# Patient Record
Sex: FEMALE | Race: WHITE | Employment: UNEMPLOYED | URBAN - METROPOLITAN AREA
[De-identification: names, ages, dates, MRNs, and addresses within clinical notes are randomized per-mention and may not be internally consistent; named-entity substitution may affect disease eponyms.]

---
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
## 2018-03-12 PROBLEM — O47.00 PRETERM CONTRACTIONS: Status: RESOLVED | Noted: 2017-10-20 | Resolved: 2018-03-12

## 2018-03-12 PROBLEM — Z3A.10 10 WEEKS GESTATION OF PREGNANCY: Status: RESOLVED | Noted: 2017-04-18 | Resolved: 2018-03-12

## 2018-03-12 PROBLEM — Z34.90 PREGNANCY: Status: RESOLVED | Noted: 2017-08-24 | Resolved: 2018-03-12

## 2018-11-20 PROBLEM — E66.01 SEVERE OBESITY (HCC): Status: ACTIVE | Noted: 2018-11-20

## 2022-03-18 PROBLEM — Z37.9 NORMAL LABOR: Status: ACTIVE | Noted: 2017-11-13

## 2022-03-19 PROBLEM — E66.01 SEVERE OBESITY (HCC): Status: ACTIVE | Noted: 2018-11-20

## 2022-03-19 PROBLEM — I49.1 ATRIAL CONTRACTIONS, PREMATURE: Status: ACTIVE | Noted: 2017-10-25

## 2022-06-24 PROBLEM — R11.2 NAUSEA & VOMITING: Status: ACTIVE | Noted: 2022-06-24

## 2022-06-27 PROBLEM — R11.2 NAUSEA AND VOMITING: Status: ACTIVE | Noted: 2022-06-27

## 2023-04-02 ENCOUNTER — APPOINTMENT (OUTPATIENT)
Dept: ULTRASOUND IMAGING | Age: 23
End: 2023-04-02
Attending: EMERGENCY MEDICINE
Payer: COMMERCIAL

## 2023-04-02 ENCOUNTER — HOSPITAL ENCOUNTER (EMERGENCY)
Age: 23
Discharge: HOME | End: 2023-04-02
Attending: EMERGENCY MEDICINE
Payer: COMMERCIAL

## 2023-04-02 DIAGNOSIS — Z34.91 FIRST TRIMESTER PREGNANCY: ICD-10-CM

## 2023-04-02 DIAGNOSIS — R42 DIZZINESS: Primary | ICD-10-CM

## 2023-04-02 LAB
ALBUMIN SERPL-MCNC: 4.3 G/DL (ref 3.5–5)
ALBUMIN/GLOB SERPL: 1.1 (ref 1.1–2.2)
ALP SERPL-CCNC: 59 U/L (ref 45–117)
ALT SERPL-CCNC: 25 U/L (ref 12–78)
ANION GAP SERPL CALC-SCNC: 13 MMOL/L (ref 5–15)
APPEARANCE UR: CLEAR
AST SERPL-CCNC: 18 U/L (ref 15–37)
BACTERIA URNS QL MICRO: NEGATIVE /HPF
BASOPHILS # BLD: 0 K/UL (ref 0–0.1)
BASOPHILS NFR BLD: 1 % (ref 0–1)
BILIRUB SERPL-MCNC: 0.6 MG/DL (ref 0.2–1)
BILIRUB UR QL: NEGATIVE
BUN SERPL-MCNC: 12 MG/DL (ref 6–20)
BUN/CREAT SERPL: 16 (ref 12–20)
CALCIUM SERPL-MCNC: 9.3 MG/DL (ref 8.5–10.1)
CHLORIDE SERPL-SCNC: 103 MMOL/L (ref 97–108)
CO2 SERPL-SCNC: 24 MMOL/L (ref 21–32)
COLOR UR: NORMAL
CREAT SERPL-MCNC: 0.75 MG/DL (ref 0.55–1.02)
DIFFERENTIAL METHOD BLD: ABNORMAL
EOSINOPHIL # BLD: 0.1 K/UL (ref 0–0.4)
EOSINOPHIL NFR BLD: 1 % (ref 0–7)
EPITH CASTS URNS QL MICRO: NORMAL /LPF
ERYTHROCYTE [DISTWIDTH] IN BLOOD BY AUTOMATED COUNT: 12.9 % (ref 11.5–14.5)
GLOBULIN SER CALC-MCNC: 3.9 G/DL (ref 2–4)
GLUCOSE SERPL-MCNC: 93 MG/DL (ref 65–100)
GLUCOSE UR STRIP.AUTO-MCNC: NEGATIVE MG/DL
HCG SERPL-ACNC: 111 MIU/ML (ref 0–6)
HCT VFR BLD AUTO: 42.5 % (ref 35–47)
HGB BLD-MCNC: 14.4 G/DL (ref 11.5–16)
HGB UR QL STRIP: NEGATIVE
IMM GRANULOCYTES # BLD AUTO: 0 K/UL (ref 0–0.04)
IMM GRANULOCYTES NFR BLD AUTO: 1 % (ref 0–0.5)
KETONES UR QL STRIP.AUTO: NEGATIVE MG/DL
LEUKOCYTE ESTERASE UR QL STRIP.AUTO: NEGATIVE
LYMPHOCYTES # BLD: 1.4 K/UL (ref 0.8–3.5)
LYMPHOCYTES NFR BLD: 25 % (ref 12–49)
MAGNESIUM SERPL-MCNC: 2.2 MG/DL (ref 1.6–2.4)
MCH RBC QN AUTO: 29.4 PG (ref 26–34)
MCHC RBC AUTO-ENTMCNC: 33.9 G/DL (ref 30–36.5)
MCV RBC AUTO: 86.7 FL (ref 80–99)
MONOCYTES # BLD: 0.4 K/UL (ref 0–1)
MONOCYTES NFR BLD: 6 % (ref 5–13)
NEUTS SEG # BLD: 3.7 K/UL (ref 1.8–8)
NEUTS SEG NFR BLD: 66 % (ref 32–75)
NITRITE UR QL STRIP.AUTO: NEGATIVE
NRBC # BLD: 0 K/UL (ref 0–0.01)
NRBC BLD-RTO: 0 PER 100 WBC
PH UR STRIP: 7.5 (ref 5–8)
PLATELET # BLD AUTO: 254 K/UL (ref 150–400)
PMV BLD AUTO: 9.4 FL (ref 8.9–12.9)
POTASSIUM SERPL-SCNC: 3.5 MMOL/L (ref 3.5–5.1)
PROT SERPL-MCNC: 8.2 G/DL (ref 6.4–8.2)
PROT UR STRIP-MCNC: NEGATIVE MG/DL
RBC # BLD AUTO: 4.9 M/UL (ref 3.8–5.2)
RBC #/AREA URNS HPF: NORMAL /HPF (ref 0–5)
SODIUM SERPL-SCNC: 140 MMOL/L (ref 136–145)
SP GR UR REFRACTOMETRY: 1.01 (ref 1–1.03)
UROBILINOGEN UR QL STRIP.AUTO: 0.2 EU/DL (ref 0.2–1)
WBC # BLD AUTO: 5.6 K/UL (ref 3.6–11)
WBC URNS QL MICRO: NORMAL /HPF (ref 0–4)

## 2023-04-02 PROCEDURE — 81001 URINALYSIS AUTO W/SCOPE: CPT

## 2023-04-02 PROCEDURE — 76817 TRANSVAGINAL US OBSTETRIC: CPT

## 2023-04-02 PROCEDURE — 76801 OB US < 14 WKS SINGLE FETUS: CPT

## 2023-04-02 PROCEDURE — 85025 COMPLETE CBC W/AUTO DIFF WBC: CPT

## 2023-04-02 PROCEDURE — 84702 CHORIONIC GONADOTROPIN TEST: CPT

## 2023-04-02 PROCEDURE — 83735 ASSAY OF MAGNESIUM: CPT

## 2023-04-02 PROCEDURE — 36415 COLL VENOUS BLD VENIPUNCTURE: CPT

## 2023-04-02 PROCEDURE — 80053 COMPREHEN METABOLIC PANEL: CPT

## 2023-04-02 PROCEDURE — 99284 EMERGENCY DEPT VISIT MOD MDM: CPT

## 2023-04-02 NOTE — DISCHARGE INSTRUCTIONS
Return if you feel worse. Drink plenty of fluids. Call your ob/gyn tomorrow to arrange close follow-up.

## 2023-04-02 NOTE — ED TRIAGE NOTES
Patient arrives with c/o intermittent dizziness for the last 3 days. Patient reports new pregnancy that she found out about on Monday. C/o cramps and nausea. Last menstrual cycle was 3/4/2023. Patient has seen OB and they did 2 beta hcg levels.
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
## 2023-04-03 ENCOUNTER — PATIENT OUTREACH (OUTPATIENT)
Dept: OTHER | Age: 23
End: 2023-04-03

## 2023-04-03 NOTE — PROGRESS NOTES
Initial HPRP:   Patient on report as discharged from [facility] ED Visit 4/2/23 for Dizziness. Initial attempt to contact patient for transitions of care. Left discreet message on voicemail with this Care Coordinator's contact information. Next Outreach 4/4/23. f/u - OB/GYN Appointment      Call 911 anytime you think you may need emergency care. For example, call if:   You passed out (lost consciousness). You have sudden dizziness that doesn't get better. You have dizziness along with symptoms of a heart attack. These may include:  Chest pain or pressure, or a strange feeling in the chest.  Sweating. Shortness of breath. Nausea or vomiting. Pain, pressure, or a strange feeling in the back, neck, jaw, or upper belly or in one or both  shoulders or arms. Lightheadedness or sudden weakness. A fast or irregular heartbeat. You have symptoms of a stroke. These may include:  Sudden numbness, tingling, weakness, or loss of movement in your face, arm, or leg,  especially on only one side of your body. Sudden vision changes. Sudden trouble speaking. Sudden confusion or trouble understanding simple statements. Sudden problems with walking or balance. A sudden, severe headache that is different from past headaches. Call your doctor now or seek immediate medical care if:  You feel dizzy and have a fever, headache, or ringing in your ears. You have new or increased nausea and vomiting. Your dizziness does not go away or comes back.

## 2023-04-04 ENCOUNTER — PATIENT OUTREACH (OUTPATIENT)
Dept: OTHER | Age: 23
End: 2023-04-04

## 2023-04-04 NOTE — PROGRESS NOTES
Patient identified as eligible for [address] [place] [address] services. Second telephone outreach attempted. Left discreet voicemail with this CM confidential contact information. Will send UTR letter via [address] Next Outreach 4/19/23 f/u - OB/GYN, PCP Appointment.

## 2023-04-06 ENCOUNTER — HOSPITAL ENCOUNTER (OUTPATIENT)
Age: 23
End: 2023-04-06
Attending: STUDENT IN AN ORGANIZED HEALTH CARE EDUCATION/TRAINING PROGRAM
Payer: COMMERCIAL

## 2023-04-06 ENCOUNTER — APPOINTMENT (OUTPATIENT)
Dept: ULTRASOUND IMAGING | Age: 23
End: 2023-04-06
Attending: STUDENT IN AN ORGANIZED HEALTH CARE EDUCATION/TRAINING PROGRAM
Payer: COMMERCIAL

## 2023-04-06 PROCEDURE — 76830 TRANSVAGINAL US NON-OB: CPT

## 2023-04-06 NOTE — Clinical Note
[facility] EM[facility]Y [facility]  [address]  [address]  [phone]    Work/School Note    Date: 4/6/2023    To Whom It May concern:    Lillie Cortes was seen and treated today in the emergency room by the following provider(s):  Attending Provider: Amparo Mcmillan MD.      Lillie Cortes is excused from work/school on 04/06/23 and 04/07/23. She is medically clear to return to work/school on 4/8/2023.        Sincerely,          Megha Mendieta MD

## 2023-04-06 NOTE — ED TRIAGE NOTES
Patient arrives ambulatory to triage. Patient reports she is almost 5 weeks pregnant and is having left flank pain. Patient has a history of kidney stones but states it feels like a sharp cramping feeling. Patient states intermittent cramping and dizziness. Patient reports cramping has gotten worse. Cramping all over in front, pain left side 6/10.
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
## 2023-04-07 NOTE — DISCHARGE INSTRUCTIONS
You may be having either a spontaneous miscarriage, chemical pregnancy, or ectopic pregnancy. You might start to have vaginal bleeding. You can take up to:  1,000mg acetaminophen (2 extra strength Tylenol®) every 6 hours for pain for the next several days. Some people like alternate them (such as ibuprofen at 6am, acetaminophen at 9am, ibuprofen at noon, and so on). DO NOT take any other medications with acetaminophen, ibuprofen, naproxen, or aspirin. Come back to the Emergency Department if you experience worsening pain, heavy bleeding (more than 2 soaked pads an hour or 1 soaked pad an hour for 2 or more hours, weakness, lightheadedness, dizziness, fever, nausea, vomiting, are unable to hold down liquids, diarrhea, chest pain, or shortness of breath.

## 2023-04-19 ENCOUNTER — PATIENT OUTREACH (OUTPATIENT)
Dept: OTHER | Age: 23
End: 2023-04-19

## 2024-03-01 ENCOUNTER — HOSPITAL ENCOUNTER (EMERGENCY)
Facility: HOSPITAL | Age: 24
Discharge: HOME | End: 2024-03-01
Attending: EMERGENCY MEDICINE
Payer: COMMERCIAL

## 2024-03-01 VITALS
WEIGHT: 246.03 LBS | HEART RATE: 86 BPM | TEMPERATURE: 98.5 F | BODY MASS INDEX: 40.99 KG/M2 | HEIGHT: 65 IN | OXYGEN SATURATION: 98 % | RESPIRATION RATE: 18 BRPM | SYSTOLIC BLOOD PRESSURE: 126 MMHG | DIASTOLIC BLOOD PRESSURE: 72 MMHG

## 2024-03-01 DIAGNOSIS — K03.81 CRACKED TOOTH: Primary | ICD-10-CM

## 2024-03-01 DIAGNOSIS — K02.9 DENTAL CARIES: ICD-10-CM

## 2024-03-01 PROCEDURE — 99283 EMERGENCY DEPT VISIT LOW MDM: CPT

## 2024-03-01 RX ORDER — AZITHROMYCIN 250 MG/1
TABLET, FILM COATED ORAL
Qty: 6 TABLET | Refills: 0 | Status: SHIPPED | OUTPATIENT
Start: 2024-03-01 | End: 2024-03-11

## 2024-03-01 ASSESSMENT — ENCOUNTER SYMPTOMS
VOMITING: 0
CHEST TIGHTNESS: 0
ABDOMINAL PAIN: 0
SHORTNESS OF BREATH: 0
EYE PAIN: 0
BACK PAIN: 0
SORE THROAT: 0

## 2024-03-01 ASSESSMENT — PAIN DESCRIPTION - PAIN TYPE: TYPE: ACUTE PAIN

## 2024-03-01 ASSESSMENT — PAIN SCALES - GENERAL: PAINLEVEL_OUTOF10: 8

## 2024-03-01 ASSESSMENT — PAIN DESCRIPTION - DESCRIPTORS: DESCRIPTORS: ACHING

## 2024-03-01 ASSESSMENT — PAIN DESCRIPTION - LOCATION: LOCATION: FACE;TEETH

## 2024-03-01 ASSESSMENT — PAIN - FUNCTIONAL ASSESSMENT
PAIN_FUNCTIONAL_ASSESSMENT: 0-10
PAIN_FUNCTIONAL_ASSESSMENT: ACTIVITIES ARE NOT PREVENTED

## 2024-03-01 ASSESSMENT — PAIN DESCRIPTION - FREQUENCY: FREQUENCY: CONTINUOUS

## 2024-03-01 ASSESSMENT — PAIN DESCRIPTION - ONSET: ONSET: ON-GOING

## 2024-03-01 ASSESSMENT — PAIN DESCRIPTION - ORIENTATION: ORIENTATION: RIGHT
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
## 2024-03-02 NOTE — ED NOTES
Pain assessment on discharge was   Condition Stable  Patient discharged to home  Patient education was completed  Education taught to patient   Teaching method used was handout and verbal  Understanding of teaching was good  Patient was discharged ambulatory  Discharged with self  Valuables were given to patient remained in possession of belongings during stay.
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
## 2024-04-21 ENCOUNTER — HOSPITAL ENCOUNTER (EMERGENCY)
Facility: HOSPITAL | Age: 24
Discharge: HOME | End: 2024-04-21
Attending: EMERGENCY MEDICINE

## 2024-04-21 ENCOUNTER — APPOINTMENT (OUTPATIENT)
Facility: HOSPITAL | Age: 24
End: 2024-04-21

## 2024-04-21 VITALS
WEIGHT: 242.95 LBS | OXYGEN SATURATION: 99 % | RESPIRATION RATE: 19 BRPM | HEART RATE: 78 BPM | TEMPERATURE: 98.2 F | BODY MASS INDEX: 39.04 KG/M2 | HEIGHT: 66 IN | SYSTOLIC BLOOD PRESSURE: 128 MMHG | DIASTOLIC BLOOD PRESSURE: 86 MMHG

## 2024-04-21 DIAGNOSIS — M79.604 RIGHT LEG PAIN: Primary | ICD-10-CM

## 2024-04-21 LAB — ECHO BSA: 2.27 M2

## 2024-04-21 PROCEDURE — 99284 EMERGENCY DEPT VISIT MOD MDM: CPT

## 2024-04-21 PROCEDURE — 93971 EXTREMITY STUDY: CPT

## 2024-04-21 ASSESSMENT — PAIN SCALES - GENERAL: PAINLEVEL_OUTOF10: 6

## 2024-04-21 ASSESSMENT — PAIN DESCRIPTION - LOCATION: LOCATION: LEG

## 2024-04-21 ASSESSMENT — PAIN DESCRIPTION - ORIENTATION: ORIENTATION: RIGHT
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
## 2024-04-21 NOTE — ED TRIAGE NOTES
Pt c/o increased LLE pain, focused behind her knee and upper calf. Pt went to [facility] and they wanted her to go to the ED for a doppler. Pt wearing boot for foot fx from 3/29/24.

## 2024-04-21 NOTE — ED NOTES
Patient is discharged home. Alert, oriented, and ambulatory. Patient is in no distress. Education given regarding follow up. Patient verbalizes understanding.

## 2024-07-11 ENCOUNTER — APPOINTMENT (OUTPATIENT)
Facility: HOSPITAL | Age: 24
End: 2024-07-11
Payer: COMMERCIAL

## 2024-07-11 ENCOUNTER — HOSPITAL ENCOUNTER (EMERGENCY)
Facility: HOSPITAL | Age: 24
Discharge: HOME | End: 2024-07-11
Attending: STUDENT IN AN ORGANIZED HEALTH CARE EDUCATION/TRAINING PROGRAM
Payer: COMMERCIAL

## 2024-07-11 VITALS
OXYGEN SATURATION: 97 % | TEMPERATURE: 98.6 F | SYSTOLIC BLOOD PRESSURE: 115 MMHG | HEIGHT: 65 IN | HEART RATE: 99 BPM | RESPIRATION RATE: 16 BRPM | WEIGHT: 248.9 LBS | BODY MASS INDEX: 41.47 KG/M2 | DIASTOLIC BLOOD PRESSURE: 73 MMHG

## 2024-07-11 DIAGNOSIS — S09.90XA CLOSED HEAD INJURY, INITIAL ENCOUNTER: ICD-10-CM
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
## 2024-07-11 DIAGNOSIS — R07.81 RIB PAIN ON RIGHT SIDE: ICD-10-CM

## 2024-07-11 LAB — HCG UR QL: NEGATIVE

## 2024-07-11 PROCEDURE — 81025 URINE PREGNANCY TEST: CPT

## 2024-07-11 PROCEDURE — 99283 EMERGENCY DEPT VISIT LOW MDM: CPT

## 2024-07-11 PROCEDURE — 6370000000 HC RX 637 (ALT 250 FOR IP): Performed by: STUDENT IN AN ORGANIZED HEALTH CARE EDUCATION/TRAINING PROGRAM

## 2024-07-11 PROCEDURE — 71101 X-RAY EXAM UNILAT RIBS/CHEST: CPT

## 2024-07-11 RX ORDER — IBUPROFEN 600 MG/1
600 TABLET ORAL
Status: COMPLETED | OUTPATIENT
Start: 2024-07-11 | End: 2024-07-11

## 2024-07-11 RX ORDER — ACETAMINOPHEN 500 MG
1000 TABLET ORAL ONCE
Status: COMPLETED | OUTPATIENT
Start: 2024-07-11 | End: 2024-07-11

## 2024-07-11 RX ORDER — ACETAMINOPHEN 500 MG
1000 TABLET ORAL EVERY 6 HOURS PRN
Qty: 56 TABLET | Refills: 0 | Status: SHIPPED | OUTPATIENT
Start: 2024-07-11 | End: 2024-07-18

## 2024-07-11 RX ORDER — IBUPROFEN 600 MG/1
600 TABLET ORAL EVERY 6 HOURS PRN
Qty: 28 TABLET | Refills: 0 | Status: SHIPPED | OUTPATIENT
Start: 2024-07-11 | End: 2024-07-18

## 2024-07-11 RX ADMIN — ACETAMINOPHEN 1000 MG: 500 TABLET ORAL at 04:18

## 2024-07-11 RX ADMIN — IBUPROFEN 600 MG: 600 TABLET, FILM COATED ORAL at 04:18

## 2024-07-11 ASSESSMENT — PAIN SCALES - GENERAL: PAINLEVEL_OUTOF10: 5

## 2024-07-11 ASSESSMENT — LIFESTYLE VARIABLES
HOW MANY STANDARD DRINKS CONTAINING ALCOHOL DO YOU HAVE ON A TYPICAL DAY: 1 OR 2
HOW OFTEN DO YOU HAVE A DRINK CONTAINING ALCOHOL: MONTHLY OR LESS

## 2024-07-11 ASSESSMENT — PAIN - FUNCTIONAL ASSESSMENT: PAIN_FUNCTIONAL_ASSESSMENT: 0-10
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
## 2024-07-11 NOTE — ED NOTES
DC  patient per orders  Discussed DC pain management, verbalized understanding  Patient ambulated from ED without difficulty   Patient verbalized she has a safe place to stay tonight and does not feel in danger
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
## 2024-07-12 ENCOUNTER — CARE COORDINATION (OUTPATIENT)
Dept: OTHER | Facility: CLINIC | Age: 24
End: 2024-07-12

## 2024-07-12 NOTE — CARE COORDINATION
Ambulatory Care Coordination Note    ACM attempted to reach patient for introduction to Associate Care Management related to ER f/u, support system, safety. HIPAA compliant message left requesting a return phone call at patient convenience.     Plan for follow-up call in 1-2 days      No future appointments.    Berna Bolton LPN- Care Coordinator  Associate Care Management  [address] [id number]  Phone: [phone]  [email or web address]

## 2024-07-16 ENCOUNTER — CARE COORDINATION (OUTPATIENT)
Dept: OTHER | Facility: CLINIC | Age: 24
End: 2024-07-16

## 2024-07-31 ENCOUNTER — CARE COORDINATION (OUTPATIENT)
Dept: OTHER | Facility: CLINIC | Age: 24
End: 2024-07-31

## 2024-07-31 NOTE — CARE COORDINATION
Ambulatory Care Coordination Note    ACM attempted third and final call to patient for introduction to Associate Care Management. HIPAA compliant message left requesting a return phone call at patient convenience.    Final Unable to Reach Letter sent to patient via .    No further outreach scheduled with this ACM, patient has been provided with this ACM's contact information.  ACM will sign off care team at this time.     No future appointments.    Berna Bolton LPN- Care Coordinator  Associate Care Management  [address] [place], [place] [id number]  Phone: [phone]  [email or web address]

## 2024-09-06 ENCOUNTER — APPOINTMENT (OUTPATIENT)
Facility: HOSPITAL | Age: 24
End: 2024-09-06
Payer: COMMERCIAL

## 2024-09-06 ENCOUNTER — HOSPITAL ENCOUNTER (EMERGENCY)
Facility: HOSPITAL | Age: 24
Discharge: HOME | End: 2024-09-06
Attending: EMERGENCY MEDICINE
Payer: COMMERCIAL

## 2024-09-06 VITALS
WEIGHT: 251.32 LBS | OXYGEN SATURATION: 99 % | HEIGHT: 65 IN | HEART RATE: 83 BPM | RESPIRATION RATE: 12 BRPM | TEMPERATURE: 97.9 F | DIASTOLIC BLOOD PRESSURE: 70 MMHG | BODY MASS INDEX: 41.87 KG/M2 | SYSTOLIC BLOOD PRESSURE: 108 MMHG

## 2024-09-06 DIAGNOSIS — R07.89 ATYPICAL CHEST PAIN: Primary | ICD-10-CM

## 2024-09-06 LAB
ANION GAP SERPL CALC-SCNC: 8 MMOL/L (ref 2–12)
APPEARANCE UR: CLEAR
BACTERIA URNS QL MICRO: ABNORMAL /HPF
BASOPHILS # BLD: 0 K/UL (ref 0–0.1)
BASOPHILS NFR BLD: 1 % (ref 0–1)
BILIRUB UR QL: NEGATIVE
BUN SERPL-MCNC: 12 MG/DL (ref 6–20)
BUN/CREAT SERPL: 14 (ref 12–20)
CALCIUM SERPL-MCNC: 8.8 MG/DL (ref 8.5–10.1)
CHLORIDE SERPL-SCNC: 106 MMOL/L (ref 97–108)
CO2 SERPL-SCNC: 27 MMOL/L (ref 21–32)
COLOR UR: ABNORMAL
CREAT SERPL-MCNC: 0.83 MG/DL (ref 0.55–1.02)
DIFFERENTIAL METHOD BLD: NORMAL
EKG ATRIAL RATE: 71 BPM
EKG DIAGNOSIS: NORMAL
EKG P AXIS: 18 DEGREES
EKG P-R INTERVAL: 160 MS
EKG Q-T INTERVAL: 366 MS
EKG QRS DURATION: 72 MS
EKG QTC CALCULATION (BAZETT): 397 MS
EKG R AXIS: 13 DEGREES
EKG T AXIS: 10 DEGREES
EKG VENTRICULAR RATE: 71 BPM
EOSINOPHIL # BLD: 0.1 K/UL (ref 0–0.4)
EOSINOPHIL NFR BLD: 2 % (ref 0–7)
EPITH CASTS URNS QL MICRO: ABNORMAL /LPF
ERYTHROCYTE [DISTWIDTH] IN BLOOD BY AUTOMATED COUNT: 12.6 % (ref 11.5–14.5)
GLUCOSE SERPL-MCNC: 94 MG/DL (ref 65–100)
GLUCOSE UR STRIP.AUTO-MCNC: NEGATIVE MG/DL
HCG UR QL: NEGATIVE
HCT VFR BLD AUTO: 43.3 % (ref 35–47)
HGB BLD-MCNC: 14.4 G/DL (ref 11.5–16)
HGB UR QL STRIP: NEGATIVE
IMM GRANULOCYTES # BLD AUTO: 0 K/UL (ref 0–0.04)
IMM GRANULOCYTES NFR BLD AUTO: 0 % (ref 0–0.5)
KETONES UR QL STRIP.AUTO: NEGATIVE MG/DL
LEUKOCYTE ESTERASE UR QL STRIP.AUTO: NEGATIVE
LYMPHOCYTES # BLD: 1.7 K/UL (ref 0.8–3.5)
LYMPHOCYTES NFR BLD: 31 % (ref 12–49)
MCH RBC QN AUTO: 29.6 PG (ref 26–34)
MCHC RBC AUTO-ENTMCNC: 33.3 G/DL (ref 30–36.5)
MCV RBC AUTO: 89.1 FL (ref 80–99)
MONOCYTES # BLD: 0.5 K/UL (ref 0–1)
MONOCYTES NFR BLD: 9 % (ref 5–13)
MUCOUS THREADS URNS QL MICRO: ABNORMAL /LPF
NEUTS SEG # BLD: 3.1 K/UL (ref 1.8–8)
NEUTS SEG NFR BLD: 57 % (ref 32–75)
NITRITE UR QL STRIP.AUTO: NEGATIVE
NRBC # BLD: 0 K/UL (ref 0–0.01)
NRBC BLD-RTO: 0 PER 100 WBC
PH UR STRIP: 6.5 (ref 5–8)
PLATELET # BLD AUTO: 245 K/UL (ref 150–400)
PMV BLD AUTO: 9.1 FL (ref 8.9–12.9)
POTASSIUM SERPL-SCNC: 3.7 MMOL/L (ref 3.5–5.1)
PROT UR STRIP-MCNC: ABNORMAL MG/DL
RBC # BLD AUTO: 4.86 M/UL (ref 3.8–5.2)
RBC #/AREA URNS HPF: ABNORMAL /HPF (ref 0–5)
SODIUM SERPL-SCNC: 141 MMOL/L (ref 136–145)
SP GR UR REFRACTOMETRY: 1.02 (ref 1–1.03)
UROBILINOGEN UR QL STRIP.AUTO: 0.2 EU/DL (ref 0.2–1)
WBC # BLD AUTO: 5.6 K/UL (ref 3.6–11)
WBC URNS QL MICRO: ABNORMAL /HPF (ref 0–4)

## 2024-09-06 PROCEDURE — 85025 COMPLETE CBC W/AUTO DIFF WBC: CPT

## 2024-09-06 PROCEDURE — 81001 URINALYSIS AUTO W/SCOPE: CPT

## 2024-09-06 PROCEDURE — 99285 EMERGENCY DEPT VISIT HI MDM: CPT

## 2024-09-06 PROCEDURE — 93005 ELECTROCARDIOGRAM TRACING: CPT | Performed by: PHYSICIAN ASSISTANT

## 2024-09-06 PROCEDURE — 36415 COLL VENOUS BLD VENIPUNCTURE: CPT

## 2024-09-06 PROCEDURE — 81025 URINE PREGNANCY TEST: CPT

## 2024-09-06 PROCEDURE — 6370000000 HC RX 637 (ALT 250 FOR IP): Performed by: PHYSICIAN ASSISTANT

## 2024-09-06 PROCEDURE — 71046 X-RAY EXAM CHEST 2 VIEWS: CPT

## 2024-09-06 PROCEDURE — 80048 BASIC METABOLIC PNL TOTAL CA: CPT

## 2024-09-06 RX ORDER — ACETAMINOPHEN 500 MG
1000 TABLET ORAL 3 TIMES DAILY PRN
Qty: 180 TABLET | Refills: 0 | Status: SHIPPED | OUTPATIENT
Start: 2024-09-06

## 2024-09-06 RX ORDER — ACETAMINOPHEN 500 MG
1000 TABLET ORAL ONCE
Status: COMPLETED | OUTPATIENT
Start: 2024-09-06 | End: 2024-09-06

## 2024-09-06 RX ORDER — IBUPROFEN 600 MG/1
600 TABLET, FILM COATED ORAL 4 TIMES DAILY PRN
Qty: 20 TABLET | Refills: 0 | Status: SHIPPED | OUTPATIENT
Start: 2024-09-06 | End: 2024-09-11

## 2024-09-06 RX ADMIN — ACETAMINOPHEN 1000 MG: 500 TABLET ORAL at 14:58

## 2024-09-06 ASSESSMENT — LIFESTYLE VARIABLES
HOW MANY STANDARD DRINKS CONTAINING ALCOHOL DO YOU HAVE ON A TYPICAL DAY: PATIENT DOES NOT DRINK
HOW OFTEN DO YOU HAVE A DRINK CONTAINING ALCOHOL: NEVER

## 2024-09-06 ASSESSMENT — ENCOUNTER SYMPTOMS: SHORTNESS OF BREATH: 1
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
## 2024-09-06 NOTE — DISCHARGE INSTRUCTIONS
Your workup today shows no concerning findings.  Advised calling cardiology for close outpatient follow-up.  Return immediately if new or worsening problems.  Thank you for allowing us to be part of your care
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
## 2024-09-09 ENCOUNTER — CARE COORDINATION (OUTPATIENT)
Dept: OTHER | Facility: CLINIC | Age: 24
End: 2024-09-09

## 2024-09-10 ENCOUNTER — CARE COORDINATION (OUTPATIENT)
Dept: OTHER | Facility: CLINIC | Age: 24
End: 2024-09-10

## 2024-10-07 ENCOUNTER — CARE COORDINATION (OUTPATIENT)
Dept: OTHER | Facility: CLINIC | Age: 24
End: 2024-10-07

## 2024-10-07 NOTE — CARE COORDINATION
Ambulatory Care Coordination Note     10/7/2024 10:47 AM     Final patient outreach attempt by this ACM today to offer care management services. ACM was unable to reach the patient by telephone today;  Final UTR letter sent to patient     Patient closed (unable to reach patient) from the High Risk Care Management program on 10/7/2024.  No further Ambulatory Care Manager follow up scheduled.

## 2024-10-22 ENCOUNTER — HOSPITAL ENCOUNTER (EMERGENCY)
Facility: HOSPITAL | Age: 24
Discharge: HOME | End: 2024-10-23
Attending: EMERGENCY MEDICINE
Payer: COMMERCIAL

## 2024-10-22 DIAGNOSIS — M62.830 LUMBAR PARASPINAL MUSCLE SPASM: Primary | ICD-10-CM

## 2024-10-22 DIAGNOSIS — M53.3 SACROILIAC JOINT PAIN: ICD-10-CM

## 2024-10-22 LAB
ALBUMIN SERPL-MCNC: 3.9 G/DL (ref 3.5–5)
ALBUMIN/GLOB SERPL: 1.1 (ref 1.1–2.2)
ALP SERPL-CCNC: 58 U/L (ref 45–117)
ALT SERPL-CCNC: 33 U/L (ref 12–78)
ANION GAP SERPL CALC-SCNC: 10 MMOL/L (ref 2–12)
APPEARANCE UR: CLEAR
AST SERPL-CCNC: 34 U/L (ref 15–37)
BACTERIA URNS QL MICRO: ABNORMAL /HPF
BASOPHILS # BLD: 0 K/UL (ref 0–0.1)
BASOPHILS NFR BLD: 1 % (ref 0–1)
BILIRUB SERPL-MCNC: 0.4 MG/DL (ref 0.2–1)
BILIRUB UR QL: NEGATIVE
BUN SERPL-MCNC: 12 MG/DL (ref 6–20)
BUN/CREAT SERPL: 17 (ref 12–20)
CALCIUM SERPL-MCNC: 9.3 MG/DL (ref 8.5–10.1)
CHLORIDE SERPL-SCNC: 103 MMOL/L (ref 97–108)
CO2 SERPL-SCNC: 27 MMOL/L (ref 21–32)
COLOR UR: ABNORMAL
CREAT SERPL-MCNC: 0.69 MG/DL (ref 0.55–1.02)
DIFFERENTIAL METHOD BLD: ABNORMAL
EOSINOPHIL # BLD: 0.1 K/UL (ref 0–0.4)
EOSINOPHIL NFR BLD: 1 % (ref 0–7)
EPITH CASTS URNS QL MICRO: ABNORMAL /LPF
ERYTHROCYTE [DISTWIDTH] IN BLOOD BY AUTOMATED COUNT: 12.5 % (ref 11.5–14.5)
GLOBULIN SER CALC-MCNC: 3.7 G/DL (ref 2–4)
GLUCOSE SERPL-MCNC: 105 MG/DL (ref 65–100)
GLUCOSE UR STRIP.AUTO-MCNC: NEGATIVE MG/DL
HCG UR QL: NEGATIVE
HCT VFR BLD AUTO: 40.8 % (ref 35–47)
HGB BLD-MCNC: 14.2 G/DL (ref 11.5–16)
HGB UR QL STRIP: NEGATIVE
IMM GRANULOCYTES # BLD AUTO: 0.1 K/UL (ref 0–0.04)
IMM GRANULOCYTES NFR BLD AUTO: 1 % (ref 0–0.5)
KETONES UR QL STRIP.AUTO: NEGATIVE MG/DL
LEUKOCYTE ESTERASE UR QL STRIP.AUTO: NEGATIVE
LYMPHOCYTES # BLD: 2.4 K/UL (ref 0.8–3.5)
LYMPHOCYTES NFR BLD: 28 % (ref 12–49)
MCH RBC QN AUTO: 30.5 PG (ref 26–34)
MCHC RBC AUTO-ENTMCNC: 34.8 G/DL (ref 30–36.5)
MCV RBC AUTO: 87.7 FL (ref 80–99)
MONOCYTES # BLD: 0.7 K/UL (ref 0–1)
MONOCYTES NFR BLD: 8 % (ref 5–13)
NEUTS SEG # BLD: 5.3 K/UL (ref 1.8–8)
NEUTS SEG NFR BLD: 61 % (ref 32–75)
NITRITE UR QL STRIP.AUTO: NEGATIVE
NRBC # BLD: 0 K/UL (ref 0–0.01)
NRBC BLD-RTO: 0 PER 100 WBC
PH UR STRIP: 6 (ref 5–8)
PLATELET # BLD AUTO: 366 K/UL (ref 150–400)
PMV BLD AUTO: 11 FL (ref 8.9–12.9)
POTASSIUM SERPL-SCNC: 4.2 MMOL/L (ref 3.5–5.1)
PROT SERPL-MCNC: 7.6 G/DL (ref 6.4–8.2)
PROT UR STRIP-MCNC: NEGATIVE MG/DL
RBC # BLD AUTO: 4.65 M/UL (ref 3.8–5.2)
RBC #/AREA URNS HPF: ABNORMAL /HPF (ref 0–5)
SODIUM SERPL-SCNC: 140 MMOL/L (ref 136–145)
SP GR UR REFRACTOMETRY: 1.03 (ref 1–1.03)
URINE CULTURE IF INDICATED: ABNORMAL
UROBILINOGEN UR QL STRIP.AUTO: 1 EU/DL (ref 0.2–1)
WBC # BLD AUTO: 8.6 K/UL (ref 3.6–11)
WBC URNS QL MICRO: ABNORMAL /HPF (ref 0–4)

## 2024-10-22 PROCEDURE — 81001 URINALYSIS AUTO W/SCOPE: CPT

## 2024-10-22 PROCEDURE — 6370000000 HC RX 637 (ALT 250 FOR IP): Performed by: EMERGENCY MEDICINE

## 2024-10-22 PROCEDURE — 81025 URINE PREGNANCY TEST: CPT

## 2024-10-22 PROCEDURE — 99284 EMERGENCY DEPT VISIT MOD MDM: CPT

## 2024-10-22 PROCEDURE — 93005 ELECTROCARDIOGRAM TRACING: CPT | Performed by: EMERGENCY MEDICINE

## 2024-10-22 PROCEDURE — 80053 COMPREHEN METABOLIC PANEL: CPT

## 2024-10-22 PROCEDURE — 36415 COLL VENOUS BLD VENIPUNCTURE: CPT

## 2024-10-22 PROCEDURE — 85025 COMPLETE CBC W/AUTO DIFF WBC: CPT

## 2024-10-22 RX ORDER — NAPROXEN 250 MG/1
500 TABLET ORAL
Status: DISCONTINUED | OUTPATIENT
Start: 2024-10-22 | End: 2024-10-23 | Stop reason: HOSPADM

## 2024-10-22 RX ORDER — CYCLOBENZAPRINE HCL 10 MG
10 TABLET ORAL
Status: DISCONTINUED | OUTPATIENT
Start: 2024-10-22 | End: 2024-10-23 | Stop reason: HOSPADM

## 2024-10-22 RX ORDER — ACETAMINOPHEN 500 MG
1000 TABLET ORAL
Status: COMPLETED | OUTPATIENT
Start: 2024-10-22 | End: 2024-10-22

## 2024-10-22 RX ADMIN — ACETAMINOPHEN 1000 MG: 500 TABLET ORAL at 23:35

## 2024-10-22 ASSESSMENT — PAIN DESCRIPTION - LOCATION: LOCATION: FLANK;CHEST

## 2024-10-22 ASSESSMENT — PAIN SCALES - GENERAL: PAINLEVEL_OUTOF10: 7

## 2024-10-22 ASSESSMENT — PAIN DESCRIPTION - ORIENTATION: ORIENTATION: RIGHT;MID

## 2024-10-22 ASSESSMENT — PAIN - FUNCTIONAL ASSESSMENT: PAIN_FUNCTIONAL_ASSESSMENT: 0-10

## 2024-10-23 VITALS
WEIGHT: 257.5 LBS | SYSTOLIC BLOOD PRESSURE: 116 MMHG | HEART RATE: 95 BPM | BODY MASS INDEX: 42.9 KG/M2 | RESPIRATION RATE: 18 BRPM | TEMPERATURE: 97.4 F | DIASTOLIC BLOOD PRESSURE: 62 MMHG | HEIGHT: 65 IN | OXYGEN SATURATION: 98 %

## 2024-10-23 LAB
EKG ATRIAL RATE: 78 BPM
EKG DIAGNOSIS: NORMAL
EKG P AXIS: 38 DEGREES
EKG P-R INTERVAL: 158 MS
EKG Q-T INTERVAL: 328 MS
EKG QRS DURATION: 72 MS
EKG QTC CALCULATION (BAZETT): 373 MS
EKG R AXIS: 20 DEGREES
EKG T AXIS: 18 DEGREES
EKG VENTRICULAR RATE: 78 BPM

## 2024-10-23 PROCEDURE — 93010 ELECTROCARDIOGRAM REPORT: CPT | Performed by: SPECIALIST

## 2024-10-23 NOTE — ED PROVIDER NOTES
spouse  Labs: ordered. Decision-making details documented in ED Course.  ECG/medicine tests: ordered and independent interpretation performed. Decision-making details documented in ED Course.    Risk  OTC drugs.  Prescription drug management.        ED Course as of 10/23/24 0109   Tue Oct 22, 2024   2353 Pt refused NSAID/muscle relaxant [RS]   Wed Oct 23, 2024   0005 This EKG was interpreted by me at 1050.  Normal sinus rhythm at 70 bpm.  Normal axis.  No significant ST segment abnormalities. [JT]   0037 Pregnancy, Urine: Negative [RS]   0037 Bacteria, UA(!): 1+  Suspect 2/2 contamination rather than infection [RS]   0037 CBC and CMP are reassuring [RS]   0045 Pt is ambulatory, eager to return home [RS]      ED Course User Index  [JT] Vinod Redmond MD  [RS] Virgil Jarrett MD           CONSULTS:  None    PROCEDURES:  Unless otherwise noted below, none     Procedures    MEDS:  Medications   naproxen (NAPROSYN) tablet 500 mg (500 mg Oral Not Given 10/22/24 2336)   cyclobenzaprine (FLEXERIL) tablet 10 mg (10 mg Oral Not Given 10/22/24 2336)   acetaminophen (TYLENOL) tablet 1,000 mg (1,000 mg Oral Given 10/22/24 2335)           FINAL IMPRESSION      1. Lumbar paraspinal muscle spasm    2. Sacroiliac joint pain          DISPOSITION/PLAN   DISPOSITION   Decision To Discharge  10/23/2024 12:50:47 AM      PATIENT REFERRED TO:  [address] [address]  [phone]    As needed      DISCHARGE MEDICATIONS:  Discharge Medication List as of 10/23/2024 12:52 AM            (Please note that portions of this note were completed with a voice recognition program.  Efforts were made to edit the dictations but occasionally words are mis-transcribed.)    Virgil Jarrett MD (electronically signed)  Emergency Attending Physician              Virgil Jarrett MD  10/23/24 0119

## 2024-10-23 NOTE — ED TRIAGE NOTES
Pt ambulatory to triage reporting 7/10 right sided rib/flank pain that radiates to RLQ for the last few days, worse with movement. Denies urinary frequency/pain. Also reporting heartburn/midsternal CP that she has experienced over the last few years but has worsened tonight. Reports she has been supposed to follow up with a cardiologist but has not.

## 2024-10-23 NOTE — DISCHARGE INSTRUCTIONS
You have been evaluated in the Emergency Department today for back pain. Your evaluation did not find evidence of medical conditions requiring emergent intervention at this time.    You may take acetaminophen (Tylenol), either 3 regular strength (325mg) tablets or 2 extra-strength (500mg) tablets every 6 hours as well as an anti-inflammatory, either prescribed (Voltaren, Mobic) or over-the-counter (2 naproxen aka Aleve every 12 hours OR 3 ibuprofen aka Motrin every 6 hours) as needed for pain. Taking both the Tylenol as well as anti-inflammatory medications in combination can be especially effective.    Avoid lifting/twisting over the next 48 hours.   Attempt to move and stretch as able.  Apply ice or heat every 6-8 hours for 20 minutes as needed for pain.     Return to the Emergency Department if you experience worsening pain, numbness, tingling, change of color in your limbs, or any other concerning symptoms.

## 2024-10-23 NOTE — ED NOTES
[facility] Emergency Room Nursing Note        Patient Name: Gini Hart      : 2000             MRN: [id number]      Chief Complaint: Flank Pain and Chest Pain      Admit Diagnosis: No admission diagnoses are documented for this encounter.      Surgery: * No surgery found *            MD/RN reviewed discharge instructions and options with patient. Patient verbalized understanding. RN reviewed discharge instructions using teach back method. Patient ambulatory to exit without difficulty and no acute signs of distress. No complaints or needs expressed at this time. Patient counseled on medications prescribed at discharge (If prescribed). Vital signs stable. Patient to follow up with PCP/Specialist on the next business day for appointment. All questions answered by ER RN.          Lines:        Vitals: Patient Vitals for the past 12 hrs:   Temp Pulse Resp BP SpO2   10/23/24 0030 -- -- -- 116/62 98 %   10/23/24 0015 -- -- -- 105/68 95 %   10/23/24 0000 -- -- -- 110/66 97 %   10/22/24 2345 -- -- -- 110/76 96 %   10/22/24 2330 -- -- -- 110/68 96 %   10/22/24 2315 -- -- -- 112/75 97 %   10/22/24 2300 -- -- -- 112/74 95 %   10/22/24 2242 97.4 °F (36.3 °C) 95 18 132/86 96 %         Signed by: Preston Perea RN, FREEDOM, BSN, CMSRN                                              10/23/2024 at 12:59 AM

## 2024-10-24 ENCOUNTER — CARE COORDINATION (OUTPATIENT)
Dept: OTHER | Facility: CLINIC | Age: 24
End: 2024-10-24

## 2024-10-24 NOTE — CARE COORDINATION
Ambulatory Care Coordination Note     10/24/2024 4:56 PM     Patient outreach attempt by this ACM today to offer care management services. ACM was unable to reach the patient by telephone today; left voice message requesting a return phone call to this ACM.     ACM: Nasima Faulkner RN     Care Summary Note:   Pt was seen at [facility] ER 10/22/24 - 10/23/24  Diagnosis:  1. Lumbar paraspinal muscle spasm    2. Sacroiliac joint pain        PCP/Specialist follow up:       Follow Up:   Plan for next ACM outreach in approximately 1-2 days  to complete:  - outreach attempt to offer care management services.     Nasima Faulkner RN, [facility] Associate Care Manager  [address] [address]  Cell [phone] Fax [phone]

## 2024-10-25 ENCOUNTER — CARE COORDINATION (OUTPATIENT)
Dept: OTHER | Facility: CLINIC | Age: 24
End: 2024-10-25

## 2024-10-25 NOTE — CARE COORDINATION
Ambulatory Care Coordination Note     10/25/2024 4:26 PM     Patient outreach attempt by this ACM today to offer care management services. ACM was unable to reach the patient by telephone today; left voice message requesting a return phone call to this ACM.  [email or web address] message sent requesting patient to contact this ACM.     ACM: Nasima Faulkner RN     Care Summary Note:   Pt was seen at [facility] ER 10/22/24 - 10/23/24  Diagnosis:  1. Lumbar paraspinal muscle spasm    2. Sacroiliac joint pain         PCP/Specialist follow up:       Follow Up:   Plan for next ACM outreach in approximately 2 weeks to complete:  - outreach attempt to offer care management services.     Nasima Faulkner RN, [facility] Associate Care Manager  [address] [address]  Cell [phone] Fax [phone]

## 2024-11-08 ENCOUNTER — CARE COORDINATION (OUTPATIENT)
Dept: OTHER | Facility: CLINIC | Age: 24
End: 2024-11-08

## 2024-11-08 NOTE — CARE COORDINATION
Ambulatory Care Coordination Note     11/8/2024 5:58 PM     patient outreach attempt by this AC today to offer care management services. [facility] was unable to reach the patient by telephone today; left voice message requesting a return phone call to this AC.  letter mailed requesting patient  to contact this AC.   mychart message sent requesting patient to contact this AC.     Patient closed (unable to reach patient) from the High Risk Care Management program on 11/8/2024.  Patient unable to progress towards self management. .  Care management goals have been completed. No further Ambulatory Care Manager follow up scheduled.

## 2025-04-29 ENCOUNTER — TRANSCRIBE ORDERS (OUTPATIENT)
Facility: HOSPITAL | Age: 25
End: 2025-04-29

## 2025-04-29 DIAGNOSIS — H47.332 PSEUDOPAPILLEDEMA OF OPTIC DISC, LEFT EYE: ICD-10-CM

## 2025-04-29 DIAGNOSIS — H40.013 OPEN ANGLE WITH BORDERLINE FINDINGS, LOW RISK, BILATERAL: Primary | ICD-10-CM

## 2025-05-20 ENCOUNTER — HOSPITAL ENCOUNTER (OUTPATIENT)
Facility: HOSPITAL | Age: 25
Discharge: HOME | End: 2025-05-23
Payer: COMMERCIAL

## 2025-05-20 DIAGNOSIS — H40.013 OPEN ANGLE WITH BORDERLINE FINDINGS, LOW RISK, BILATERAL: ICD-10-CM

## 2025-05-20 DIAGNOSIS — H47.332 PSEUDOPAPILLEDEMA OF OPTIC DISC, LEFT EYE: ICD-10-CM

## 2025-05-20 PROCEDURE — 70551 MRI BRAIN STEM W/O DYE: CPT

## 2025-05-20 PROCEDURE — 70544 MR ANGIOGRAPHY HEAD W/O DYE: CPT

## (undated) LAB
ABO + RH BLD: (no result)
ALBUMIN SERPL-MCNC: 4.5 G/DL (ref 3.5–5)
ALBUMIN/GLOB SERPL: 1.2 (ref 1.1–2.2)
ALP SERPL-CCNC: 62 U/L (ref 45–117)
ALT SERPL-CCNC: 23 U/L (ref 12–78)
ANION GAP SERPL CALC-SCNC: 12 MMOL/L (ref 5–15)
APPEARANCE UR: CLEAR
AST SERPL-CCNC: 16 U/L (ref 15–37)
BACTERIA URNS QL MICRO: NEGATIVE /HPF
BASOPHILS # BLD: 0 K/UL (ref 0–0.1)
BASOPHILS NFR BLD: 1 % (ref 0–1)
BILIRUB SERPL-MCNC: 0.4 MG/DL (ref 0.2–1)
BILIRUB UR QL: NEGATIVE
BUN SERPL-MCNC: 10 MG/DL (ref 6–20)
BUN/CREAT SERPL: 13 (ref 12–20)
CALCIUM SERPL-MCNC: 9.5 MG/DL (ref 8.5–10.1)
CHLORIDE SERPL-SCNC: 102 MMOL/L (ref 97–108)
CO2 SERPL-SCNC: 25 MMOL/L (ref 21–32)
COLOR UR: (no result)
CREAT SERPL-MCNC: 0.78 MG/DL (ref 0.55–1.02)
DIFFERENTIAL METHOD BLD: (no result)
EOSINOPHIL # BLD: 0.1 K/UL (ref 0–0.4)
EOSINOPHIL NFR BLD: 1 % (ref 0–7)
EPITH CASTS URNS QL MICRO: (no result) /LPF
ERYTHROCYTE [DISTWIDTH] IN BLOOD BY AUTOMATED COUNT: 13.1 % (ref 11.5–14.5)
GLOBULIN SER CALC-MCNC: 3.8 G/DL (ref 2–4)
GLUCOSE SERPL-MCNC: 104 MG/DL (ref 65–100)
GLUCOSE UR STRIP.AUTO-MCNC: NEGATIVE MG/DL
HCG SERPL-ACNC: 99 MIU/ML (ref 0–6)
HCT VFR BLD AUTO: 42.2 % (ref 35–47)
HGB BLD-MCNC: 14.2 G/DL (ref 11.5–16)
HGB UR QL STRIP: NEGATIVE
IMM GRANULOCYTES # BLD AUTO: 0 K/UL (ref 0–0.04)
IMM GRANULOCYTES NFR BLD AUTO: 0 % (ref 0–0.5)
KETONES UR QL STRIP.AUTO: NEGATIVE MG/DL
LEUKOCYTE ESTERASE UR QL STRIP.AUTO: (no result)
LYMPHOCYTES # BLD: 1.9 K/UL (ref 0.8–3.5)
LYMPHOCYTES NFR BLD: 26 % (ref 12–49)
MCH RBC QN AUTO: 29.3 PG (ref 26–34)
MCHC RBC AUTO-ENTMCNC: 33.6 G/DL (ref 30–36.5)
MCV RBC AUTO: 87.2 FL (ref 80–99)
MONOCYTES # BLD: 0.6 K/UL (ref 0–1)
MONOCYTES NFR BLD: 8 % (ref 5–13)
NEUTS SEG # BLD: 4.8 K/UL (ref 1.8–8)
NEUTS SEG NFR BLD: 65 % (ref 32–75)
NITRITE UR QL STRIP.AUTO: NEGATIVE
NRBC # BLD: 0 K/UL (ref 0–0.01)
NRBC BLD-RTO: 0 PER 100 WBC
PH UR STRIP: 6 (ref 5–8)
PLATELET # BLD AUTO: 245 K/UL (ref 150–400)
PMV BLD AUTO: 9.8 FL (ref 8.9–12.9)
POTASSIUM SERPL-SCNC: 3.6 MMOL/L (ref 3.5–5.1)
PROT SERPL-MCNC: 8.3 G/DL (ref 6.4–8.2)
PROT UR STRIP-MCNC: NEGATIVE MG/DL
RBC # BLD AUTO: 4.84 M/UL (ref 3.8–5.2)
RBC #/AREA URNS HPF: (no result) /HPF (ref 0–5)
SODIUM SERPL-SCNC: 139 MMOL/L (ref 136–145)
SP GR UR REFRACTOMETRY: 1.02 (ref 1–1.03)
UROBILINOGEN UR QL STRIP.AUTO: 0.2 EU/DL (ref 0.2–1)
WBC # BLD AUTO: 7.4 K/UL (ref 3.6–11)
WBC URNS QL MICRO: (no result) /HPF (ref 0–4)